# Patient Record
Sex: MALE | Race: WHITE | Employment: FULL TIME | URBAN - METROPOLITAN AREA
[De-identification: names, ages, dates, MRNs, and addresses within clinical notes are randomized per-mention and may not be internally consistent; named-entity substitution may affect disease eponyms.]

---

## 2019-04-19 ENCOUNTER — HOSPITAL ENCOUNTER (EMERGENCY)
Age: 44
Discharge: HOME | End: 2019-04-19
Payer: COMMERCIAL

## 2019-04-19 VITALS
HEART RATE: 77 BPM | TEMPERATURE: 98.2 F | SYSTOLIC BLOOD PRESSURE: 161 MMHG | WEIGHT: 160 LBS | OXYGEN SATURATION: 98 % | RESPIRATION RATE: 16 BRPM | DIASTOLIC BLOOD PRESSURE: 95 MMHG

## 2019-04-19 DIAGNOSIS — M54.6 ACUTE MIDLINE THORACIC BACK PAIN: Primary | ICD-10-CM

## 2019-04-19 PROCEDURE — 99283 EMERGENCY DEPT VISIT LOW MDM: CPT

## 2019-04-19 RX ORDER — NAPROXEN 500 MG/1
500 TABLET ORAL 2 TIMES DAILY
Qty: 20 TABLET | Refills: 0 | Status: SHIPPED | OUTPATIENT
Start: 2019-04-19 | End: 2019-04-29

## 2019-04-19 ASSESSMENT — PAIN SCALES - GENERAL: PAINLEVEL_OUTOF10: 7

## 2019-04-19 ASSESSMENT — PAIN DESCRIPTION - LOCATION: LOCATION: BACK

## 2019-04-19 NOTE — ED PROVIDER NOTES
[address] ED  eMERGENCY dEPARTMENT eNCOUnter        Pt Name: Gracie Bautista  MRN: [id number]  [place] 1975  Date of evaluation: 4/19/2019  Provider: QUOC Sharma CNP  PCP: No primary care provider on file. This patient was not seen and evaluated by the attending physician. CHIEF COMPLAINT       Chief Complaint   Patient presents with    Back Pain     low middle back pain. hx of back trouble. HISTORY OF PRESENT ILLNESS   (Location/Symptom, Timing/Onset, Context/Setting, Quality, Duration, Modifying Factors, Severity)  Note limiting factors. Gracie Bautista is a 39 y.o. male who presents for evaluation of back pain. Patient states that he's had the \"back issues\" for 2 weeks. He states that he had surgery on L4/L5 in 2001 but has had no problems since then. He states that the pain is in the middle of his back right on his spine. 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 He describes this pain as pressure and burning. He has been using heat and cold with some relief. Denies radiation to his legs or arms. Patient denies shortness of breath, chest pain, fever, chills, nausea, and urinary symptoms. He states that he does exercise daily and eats healthy. Nursing Notes were all reviewed and agreed with or any disagreements were addressed  in the HPI. REVIEW OF SYSTEMS    (2-9 systems for level 4, 10 or more for level 5)     Review of Systems    Positives and Pertinent negatives as per HPI. Except as noted abovein the ROS, all other systems were reviewed and negative. PAST MEDICAL HISTORY   History reviewed. No pertinent past medical history.       SURGICAL HISTORY     Past Surgical History:   Procedure Laterality Date    BACK SURGERY           CURRENTMEDICATIONS       Previous Medications    No medications on file         ALLERGIES     Patient has no known allergies. FAMILYHISTORY     History reviewed. No pertinent family history. SOCIAL HISTORY       Social History     Socioeconomic History    Marital status: None     Spouse name: None    Number of children: None    Years of education: None    Highest education level: None   Occupational History    None   Social Needs    Financial resource strain: None    Food insecurity:     Worry: None     Inability: None    Transportation needs:     Medical: None     Non-medical: None   Tobacco Use    Smoking status: Current Every Day Smoker   Substance and Sexual Activity    Alcohol use: Yes    Drug use: Never    Sexual activity: None   Lifestyle    Physical activity:     Days per week: None     Minutes per session: None    Stress: None   Relationships    Social connections:     Talks on phone: None     Gets together: None     Attends Restoration service: None     Active member of club or organization: None     Attends meetings of clubs or organizations: None     Relationship status: None    Intimate partner violence:     Fear of current or ex partner: None     Emotionally abused: None     Physically abused: None     Forced sexual activity: None   Other Topics Concern    None   Social History Narrative    None       SCREENINGS             PHYSICAL EXAM    (up to 7 for level 4, 8 or more for level 5)     ED Triage Vitals [04/19/19 1035]   BP Temp Temp Source Pulse Resp SpO2 Height Weight   (!) 161/95 98.2 °F (36.8 °C) Oral 77 16 98 % -- 160 lb (72.6 kg)       Physical Exam   Constitutional: He is oriented to person, place, and time. He appears well-developed and well-nourished. HENT:   Head: Normocephalic and atraumatic. Nose: Nose normal.   Eyes: Right eye exhibits no discharge. Left eye exhibits no discharge. Neck: Normal range of motion. Neck supple. Cardiovascular: Normal rate and regular rhythm. Pulmonary/Chest: Effort normal. No respiratory distress. Abdominal: Soft.  Bowel sounds Prescriptions    NAPROXEN (NAPROSYN) 500 MG TABLET    Take 1 tablet by mouth 2 times daily for 20 doses       DISCONTINUED MEDICATIONS:  Discontinued Medications    No medications on file              (Please note that portions ofthis note were completed with a voice recognition program.  Efforts were made to edit the dictations but occasionally words are mis-transcribed.)    QUOC Luque CNP (electronically signed)            QUOC Luque CNP  04/19/19 4171

## 2022-03-04 ENCOUNTER — HOSPITAL ENCOUNTER (OUTPATIENT)
Dept: GENERAL RADIOLOGY | Age: 47
Discharge: HOME | End: 2022-03-04
Payer: COMMERCIAL

## 2022-03-04 ENCOUNTER — HOSPITAL ENCOUNTER (OUTPATIENT)
Age: 47
Discharge: HOME | End: 2022-03-04
Payer: COMMERCIAL

## 2022-03-04 DIAGNOSIS — R52 PAIN: ICD-10-CM

## 2022-03-04 PROCEDURE — 72040 X-RAY EXAM NECK SPINE 2-3 VW: CPT

## 2024-01-06 ENCOUNTER — HOSPITAL ENCOUNTER (EMERGENCY)
Age: 49
Discharge: HOME | End: 2024-01-06
Attending: EMERGENCY MEDICINE
Payer: COMMERCIAL

## 2024-01-06 VITALS
RESPIRATION RATE: 17 BRPM | HEIGHT: 71 IN | DIASTOLIC BLOOD PRESSURE: 79 MMHG | TEMPERATURE: 97.7 F | BODY MASS INDEX: 20.13 KG/M2 | SYSTOLIC BLOOD PRESSURE: 150 MMHG | HEART RATE: 73 BPM | WEIGHT: 143.8 LBS | OXYGEN SATURATION: 96 %

## 2024-01-06 DIAGNOSIS — M54.50 ACUTE RIGHT-SIDED LOW BACK PAIN WITHOUT SCIATICA: ICD-10-CM

## 2024-01-06 DIAGNOSIS — S39.012A STRAIN OF LUMBAR REGION, INITIAL ENCOUNTER: Primary | ICD-10-CM

## 2024-01-06 PROCEDURE — 96372 THER/PROPH/DIAG INJ SC/IM: CPT

## 2024-01-06 PROCEDURE — 99284 EMERGENCY DEPT VISIT MOD MDM: CPT

## 2024-01-06 PROCEDURE — 6360000002 HC RX W HCPCS: Performed by: EMERGENCY MEDICINE

## 2024-01-06 PROCEDURE — 6370000000 HC RX 637 (ALT 250 FOR IP): Performed by: EMERGENCY MEDICINE

## 2024-01-06 RX ORDER — ACETAMINOPHEN 325 MG/1
650 TABLET ORAL EVERY 6 HOURS PRN
Qty: 40 TABLET | Refills: 0 | Status: SHIPPED | OUTPATIENT
Start: 2024-01-06 | End: 2024-01-11

## 2024-01-06 RX ORDER — LIDOCAINE 4 G/G
1 PATCH TOPICAL DAILY
Status: DISCONTINUED | OUTPATIENT
Start: 2024-01-06 | End: 2024-01-06 | Stop reason: HOSPADM

## 2024-01-06 RX ORDER — ACETAMINOPHEN 325 MG/1
325 TABLET ORAL ONCE
Status: COMPLETED | OUTPATIENT
Start: 2024-01-06 | End: 2024-01-06

## 2024-01-06 RX ORDER — KETOROLAC TROMETHAMINE 15 MG/ML
30 INJECTION, SOLUTION INTRAMUSCULAR; INTRAVENOUS ONCE
Status: COMPLETED | OUTPATIENT
Start: 2024-01-06 | End: 2024-01-06

## 2024-01-06 RX ORDER — KETOROLAC TROMETHAMINE 10 MG/1
10 TABLET, FILM COATED ORAL EVERY 6 HOURS PRN
Qty: 12 TABLET | Refills: 0 | Status: SHIPPED | OUTPATIENT
Start: 2024-01-06 | End: 2024-01-09

## 2024-01-06 RX ORDER — METHOCARBAMOL 500 MG/1
1000 TABLET, FILM COATED ORAL ONCE
Status: COMPLETED | OUTPATIENT
Start: 2024-01-06 | End: 2024-01-06

## 2024-01-06 RX ORDER — PREDNISONE 10 MG/1
TABLET ORAL
Qty: 20 TABLET | Refills: 0 | Status: SHIPPED | OUTPATIENT
Start: 2024-01-06 | End: 2024-01-13

## 2024-01-06 RX ORDER — METHOCARBAMOL 750 MG/1
750 TABLET, FILM COATED ORAL 4 TIMES DAILY
Qty: 40 TABLET | Refills: 0 | Status: SHIPPED | OUTPATIENT
Start: 2024-01-06 | End: 2024-01-16

## 2024-01-06 RX ADMIN — ACETAMINOPHEN 325 MG: 325 TABLET ORAL at 09:46

## 2024-01-06 RX ADMIN — KETOROLAC TROMETHAMINE 30 MG: 15 INJECTION, SOLUTION INTRAMUSCULAR; INTRAVENOUS at 09:46

## 2024-01-06 RX ADMIN — METHOCARBAMOL TABLETS 1000 MG: 500 TABLET, COATED ORAL at 09:46

## 2024-01-06 RX ADMIN — PREDNISONE 50 MG: 20 TABLET ORAL at 09:46

## 2024-01-06 ASSESSMENT — ENCOUNTER SYMPTOMS
COUGH: 0
BACK PAIN: 1
VOMITING: 0
ABDOMINAL PAIN: 0
NAUSEA: 0
SHORTNESS OF BREATH: 0

## 2024-01-06 ASSESSMENT — LIFESTYLE VARIABLES
HOW MANY STANDARD DRINKS CONTAINING ALCOHOL DO YOU HAVE ON A TYPICAL DAY: PATIENT DOES NOT DRINK
HOW OFTEN DO YOU HAVE A DRINK CONTAINING ALCOHOL: NEVER

## 2024-01-06 NOTE — ED PROVIDER NOTES
Emergency Department Attending Physician Note  Location: [facility] ED  1/6/2024       Pt Name: Je Faulkner  MRN: [id number]  Birthdate 1975    Date of evaluation: 1/6/2024  Provider: PAM PARKER DO  PCP: Jeffry Gold DO    Note Started: 8:52 AM EST 1/6/24    CHIEF COMPLAINT  Chief Complaint   Patient presents with    Back Pain     Pt reports lower back pain that began Tuesday from coughing. Rates pain currently at a 6/10. Reports hot/cold and frequent movement improves it       HISTORY OF PRESENT ILLNESS:  History obtained by patient. Limitations to history : None.     Je Faulkner is a 48 y.o. male with a significant PMHx of previous discectomy at L4-L5, patient believes, in 2011, presenting emergency department today with what he describes as a back strain that happened after he coughed the other day.  Has been taking ibuprofen, which is helping, but he was unable to get to his PCP for over 2 weeks.  He is able to ambulate, and says this pain starts in his mid low back, radiates down to his right buttock, and then wraps around his right thigh.  Feels like a cramping and sharp pain sensation.  Denies any numbness, weakness, tingling.  Says standing up for a little while will help the pain feel better, but then it starts to hurt again, so he will sit back down, and has been rotating this position changes for comfort. Is a very active and fit person, and has been trying to augment his healing with stretching and gentle exercises as well.    Denies any other concerns including abdominal pain, back pain, fevers, chills.  Otherwise, no lightheadedness or dizziness.  No falls.  No dysuria, hematuria, flank pain, or any other urinary symptoms or bowel changes    Nursing Notes were all reviewed and agreed with or any disagreements were addressed in the HPI.      MEDICAL HISTORY  History reviewed. No pertinent past medical history.     SURGICAL HISTORY  Past Surgical History:   Procedure  Laterality Date    BACK SURGERY         CURRENT MEDICATIONS  Discharge Medication List as of 1/6/2024  9:55 AM        CONTINUE these medications which have NOT CHANGED    Details   naproxen (NAPROSYN) 500 MG tablet Take 1 tablet by mouth 2 times daily for 20 doses, Disp-20 tablet, R-0Print             ALLERGIES  Patient has no known allergies.    FAMILYHISTORY  History reviewed. No pertinent family history.    SOCIAL HISTORY  Social History     Tobacco Use    Smoking status: Every Day   Substance Use Topics    Alcohol use: Yes    Drug use: Never       SCREENINGS    [place] Coma Scale  Eye Opening: Spontaneous  Best Verbal Response: Oriented  Best Motor Response: Obeys commands  [place] Coma Scale Score: 15       CIWA Assessment  BP: (!) 150/79  Pulse: 73             REVIEW OF SYSTEMS:    Review of Systems   Constitutional:  Negative for activity change, appetite change and fever.   HENT:  Negative for congestion and postnasal drip.    Respiratory:  Negative for cough and shortness of breath.    Cardiovascular:  Negative for chest pain.   Gastrointestinal:  Negative for abdominal pain, nausea and vomiting.   Genitourinary:  Negative for dysuria and frequency.   Musculoskeletal:  Positive for back pain. Negative for arthralgias, gait problem, neck pain and neck stiffness.   Skin:  Negative for rash and wound.   Neurological:  Negative for dizziness and light-headedness.     Positives and Pertinent negatives as per HPI.    _____________________________________      PHYSICAL EXAM:     Vitals:    01/06/24 0852 01/06/24 1000   BP: (!) 159/89 (!) 150/79   Pulse: 71 73   Resp: 17    Temp: 97.7 °F (36.5 °C)    TempSrc: Oral    SpO2: 94% 96%   Weight: 65.2 kg (143 lb 12.8 oz)    Height: 1.798 m (5' 10.8\")        Physical Exam  Constitutional:       Appearance: Normal appearance. He is normal weight. He is ill-appearing. He is not diaphoretic.      Comments: Appears uncomfortable, however interactive, conversational, pleasant,